# Patient Record
Sex: FEMALE | Race: WHITE | NOT HISPANIC OR LATINO | URBAN - METROPOLITAN AREA
[De-identification: names, ages, dates, MRNs, and addresses within clinical notes are randomized per-mention and may not be internally consistent; named-entity substitution may affect disease eponyms.]

---

## 2017-04-12 ENCOUNTER — OUTPATIENT (OUTPATIENT)
Dept: OUTPATIENT SERVICES | Facility: HOSPITAL | Age: 72
LOS: 1 days | End: 2017-04-12

## 2017-06-20 ENCOUNTER — OUTPATIENT (OUTPATIENT)
Dept: OUTPATIENT SERVICES | Facility: HOSPITAL | Age: 72
LOS: 1 days | End: 2017-06-20

## 2018-01-12 ENCOUNTER — OUTPATIENT (OUTPATIENT)
Dept: OUTPATIENT SERVICES | Facility: HOSPITAL | Age: 73
LOS: 1 days | End: 2018-01-12

## 2018-04-24 ENCOUNTER — OUTPATIENT (OUTPATIENT)
Dept: OUTPATIENT SERVICES | Facility: HOSPITAL | Age: 73
LOS: 1 days | End: 2018-04-24

## 2019-05-09 ENCOUNTER — OUTPATIENT (OUTPATIENT)
Dept: OUTPATIENT SERVICES | Facility: HOSPITAL | Age: 74
LOS: 1 days | End: 2019-05-09

## 2019-07-16 ENCOUNTER — APPOINTMENT (OUTPATIENT)
Dept: NEUROSURGERY | Facility: CLINIC | Age: 74
End: 2019-07-16
Payer: MEDICARE

## 2019-07-16 VITALS
BODY MASS INDEX: 31.22 KG/M2 | DIASTOLIC BLOOD PRESSURE: 61 MMHG | WEIGHT: 159 LBS | HEIGHT: 60 IN | SYSTOLIC BLOOD PRESSURE: 105 MMHG | HEART RATE: 81 BPM

## 2019-07-16 DIAGNOSIS — M43.10 SPONDYLOLISTHESIS, SITE UNSPECIFIED: ICD-10-CM

## 2019-07-16 DIAGNOSIS — G57.93 UNSPECIFIED MONONEUROPATHY OF BILATERAL LOWER LIMBS: ICD-10-CM

## 2019-07-16 DIAGNOSIS — Z98.890 OTHER SPECIFIED POSTPROCEDURAL STATES: ICD-10-CM

## 2019-07-16 PROBLEM — Z00.00 ENCOUNTER FOR PREVENTIVE HEALTH EXAMINATION: Status: ACTIVE | Noted: 2019-07-16

## 2019-07-16 PROCEDURE — 99204 OFFICE O/P NEW MOD 45 MIN: CPT

## 2019-07-16 RX ORDER — ROSUVASTATIN CALCIUM 40 MG/1
40 TABLET, FILM COATED ORAL
Refills: 0 | Status: ACTIVE | COMMUNITY

## 2019-07-16 RX ORDER — POTASSIUM CHLORIDE 1125 MG/1
15 TABLET, EXTENDED RELEASE ORAL
Refills: 0 | Status: ACTIVE | COMMUNITY

## 2019-07-16 RX ORDER — PANCRELIPASE 120000; 24000; 76000 [USP'U]/1; [USP'U]/1; [USP'U]/1
24000-76000 CAPSULE, DELAYED RELEASE PELLETS ORAL
Refills: 0 | Status: ACTIVE | COMMUNITY

## 2019-07-16 RX ORDER — PANTOPRAZOLE 40 MG/1
40 TABLET, DELAYED RELEASE ORAL
Refills: 0 | Status: ACTIVE | COMMUNITY

## 2019-07-16 RX ORDER — EZETIMIBE 10 MG/1
10 TABLET ORAL
Refills: 0 | Status: ACTIVE | COMMUNITY

## 2019-07-16 RX ORDER — HYDRALAZINE HYDROCHLORIDE 50 MG/1
50 TABLET ORAL
Refills: 0 | Status: ACTIVE | COMMUNITY

## 2019-07-16 RX ORDER — ESCITALOPRAM OXALATE 5 MG/1
5 TABLET ORAL
Refills: 0 | Status: ACTIVE | COMMUNITY

## 2019-07-16 RX ORDER — FOLIC ACID 1 MG/1
1 TABLET ORAL
Refills: 0 | Status: ACTIVE | COMMUNITY

## 2019-07-16 RX ORDER — ASPIRIN 81 MG
81 TABLET, DELAYED RELEASE (ENTERIC COATED) ORAL
Refills: 0 | Status: ACTIVE | COMMUNITY

## 2019-07-16 RX ORDER — LOSARTAN POTASSIUM 100 MG/1
100 TABLET, FILM COATED ORAL
Refills: 0 | Status: ACTIVE | COMMUNITY

## 2019-07-16 RX ORDER — FLUTICASONE PROPIONATE 50 UG/1
50 SPRAY, METERED NASAL
Refills: 0 | Status: ACTIVE | COMMUNITY

## 2019-07-16 RX ORDER — GABAPENTIN 300 MG/1
300 CAPSULE ORAL
Refills: 0 | Status: ACTIVE | COMMUNITY

## 2019-07-16 RX ORDER — FUROSEMIDE 40 MG/1
40 TABLET ORAL
Refills: 0 | Status: ACTIVE | COMMUNITY

## 2019-07-16 RX ORDER — COLESEVELAM HYDROCHLORIDE 625 MG/1
625 TABLET, FILM COATED ORAL
Refills: 0 | Status: ACTIVE | COMMUNITY

## 2019-07-16 NOTE — PLAN
[FreeTextEntry1] : This patient is status post L4-5 decompressive laminectomy and fusion. Unfortunately she has a neuropathy and it's unlikely that a change. The fusion procedure appears to be in them properly and screws were positioned. The lateral fusion as taken quite well. There is no overt instability at the adjacent levels although slightly some facet arthropathy noted. 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  \par \par I have not recommended any additional spinal surgery at this time. I recommended that she revisit her neurologist and pain management physician for optimization of the conditions aforementioned. She may be a candidate for facet injections are SI joint injection. She may be a candidate for spinal stimulator trial of her pain warrants it. In terms of her neuropathy a neurologist would be best to determine whether or not medication treatment is being optimized.\par \par

## 2019-07-16 NOTE — PHYSICAL EXAM
[Motor Tone] : muscle tone was normal in all four extremities [Motor Strength] : muscle strength was normal in all four extremities [Involuntary Movements] : no involuntary movements were seen [No Muscle Atrophy] : normal bulk in all four extremities [Abnormal Walk] : normal gait [Antalgic] : antalgic [Romberg's Sign] : Romberg's sign was negtive [FreeTextEntry7] : She has a stocking distribution in both lower gravities of numbness and tingling pins and needles.

## 2019-07-16 NOTE — HISTORY OF PRESENT ILLNESS
[de-identified] : \john Lockhart is a pleasant 73-year-old here for evaluation of her lumbar spine. I seen her in the distant past.  She had a grade 1 spondylolisthesis at L4-5 with severe stenosis. She opted for surgery elsewhere and had a relatively uneventful course. She returns with worsening difficulties walking and severe back pain. She's been under the care of her primary doctor, pain management doctor, her neurologist, and her rheumatologist.  She has symptoms reminiscent of facet joint or SI joint function on the right. She also has symptoms that are reminiscent of a rather significant neuropathy.\par \par \par PMD   Stevan Green\par neurology   Сергей Abreu\par PM  Vidal Baltazar\par rheum   [place]\par \par 2016   Lumbar fusion Sushila

## 2019-07-16 NOTE — RESULTS/DATA
[FreeTextEntry1] : [facility] MRI of the cervical and lumbar spine were reviewed. She has mild cervical spondylosis without any significant stenosis. Lumbar spine study shows the presence of the laminectomy L4-5 with instrumentation and fusion. X-rays done in the office confirmed that there truly is fusion across L4-5.

## 2019-07-16 NOTE — REASON FOR VISIT
[New Patient Visit] : a new patient visit [FreeTextEntry1] : LOWER BACK- NECK PAIN. [facility]- PREVIOUS SURGERY BY [facility].

## 2019-08-13 ENCOUNTER — APPOINTMENT (OUTPATIENT)
Dept: NEUROSURGERY | Facility: CLINIC | Age: 74
End: 2019-08-13

## 2019-10-18 ENCOUNTER — APPOINTMENT (OUTPATIENT)
Dept: RADIOLOGY | Facility: CLINIC | Age: 74
End: 2019-10-18
Payer: MEDICARE

## 2019-10-18 PROCEDURE — 74019 RADEX ABDOMEN 2 VIEWS: CPT

## 2019-10-25 ENCOUNTER — APPOINTMENT (OUTPATIENT)
Dept: RADIOLOGY | Facility: CLINIC | Age: 74
End: 2019-10-25
Payer: MEDICARE

## 2019-10-25 PROCEDURE — 73521 X-RAY EXAM HIPS BI 2 VIEWS: CPT

## 2019-10-25 PROCEDURE — 77080 DXA BONE DENSITY AXIAL: CPT

## 2019-10-28 ENCOUNTER — EMERGENCY (EMERGENCY)
Facility: HOSPITAL | Age: 74
LOS: 1 days | End: 2019-10-28
Admitting: EMERGENCY MEDICINE
Payer: MEDICARE

## 2019-10-28 PROCEDURE — 99284 EMERGENCY DEPT VISIT MOD MDM: CPT | Mod: 57

## 2019-10-28 PROCEDURE — 23620 CLTX GR HMRL TBRS FX WO MNPJ: CPT | Mod: 54

## 2019-10-28 PROCEDURE — 73060 X-RAY EXAM OF HUMERUS: CPT | Mod: 26,RT

## 2019-10-28 PROCEDURE — 73030 X-RAY EXAM OF SHOULDER: CPT | Mod: 26,RT

## 2023-06-17 ENCOUNTER — RX ONLY (RX ONLY)
Age: 78
End: 2023-06-17

## 2023-07-10 ENCOUNTER — RX ONLY (RX ONLY)
Age: 78
End: 2023-07-10

## 2023-07-10 ENCOUNTER — OFFICE (OUTPATIENT)
Facility: LOCATION | Age: 78
Setting detail: OPHTHALMOLOGY
End: 2023-07-10
Payer: MEDICARE

## 2023-07-10 DIAGNOSIS — H16.223: ICD-10-CM

## 2023-07-10 DIAGNOSIS — Z79.899: ICD-10-CM

## 2023-07-10 DIAGNOSIS — H43.813: ICD-10-CM

## 2023-07-10 DIAGNOSIS — M32.9: ICD-10-CM

## 2023-07-10 PROBLEM — H43.393 VITREOUS FLOATERS; BOTH EYES: Status: ACTIVE | Noted: 2023-07-10

## 2023-07-10 PROBLEM — Z96.1 PSEUDOPHAKIA ; BOTH EYES: Status: ACTIVE | Noted: 2023-07-10

## 2023-07-10 PROCEDURE — 99213 OFFICE O/P EST LOW 20 MIN: CPT | Performed by: OPHTHALMOLOGY

## 2023-07-10 PROCEDURE — 92134 CPTRZ OPH DX IMG PST SGM RTA: CPT | Performed by: OPHTHALMOLOGY

## 2023-07-10 ASSESSMENT — CONFRONTATIONAL VISUAL FIELD TEST (CVF)
OD_FINDINGS: FULL
OS_FINDINGS: FULL

## 2023-07-19 ASSESSMENT — SUPERFICIAL PUNCTATE KERATITIS (SPK)
OS_SPK: 1+
OD_SPK: 1+

## 2023-07-26 PROBLEM — M32.9 SYSTEMIC LUPUS ERYTHEMATOSUS, UNSPECIFIED: Status: ACTIVE | Noted: 2023-07-10

## 2023-07-26 ASSESSMENT — REFRACTION_CURRENTRX
OD_SPHERE: -0.25
OD_AXIS: 180
OD_ADD: +2.25
OS_CYLINDER: +0.75
OD_VPRISM_DIRECTION: PROGS
OS_ADD: +2.25
OD_OVR_VA: 20/
OS_OVR_VA: 20/
OS_AXIS: 153
OD_CYLINDER: +1.00
OS_SPHERE: PLANO

## 2023-07-26 ASSESSMENT — REFRACTION_MANIFEST
OS_SPHERE: PLANO
OD_SPHERE: -0.25
OD_CYLINDER: +1.00
OS_AXIS: 155
OD_AXIS: 180
OD_VA1: 20/20
OS_VA1: 20/30-2
OS_CYLINDER: +0.75

## 2023-07-26 ASSESSMENT — SPHEQUIV_DERIVED: OD_SPHEQUIV: 0.25

## 2023-07-26 ASSESSMENT — VISUAL ACUITY
OS_BCVA: 20/20
OD_BCVA: 20/40

## 2023-09-27 ENCOUNTER — RX ONLY (RX ONLY)
Age: 78
End: 2023-09-27

## 2023-09-27 ENCOUNTER — OFFICE (OUTPATIENT)
Facility: LOCATION | Age: 78
Setting detail: OPHTHALMOLOGY
End: 2023-09-27
Payer: MEDICARE

## 2023-09-27 DIAGNOSIS — H16.223: ICD-10-CM

## 2023-09-27 DIAGNOSIS — H10.433: ICD-10-CM

## 2023-09-27 PROCEDURE — 99213 OFFICE O/P EST LOW 20 MIN: CPT | Performed by: OPHTHALMOLOGY

## 2023-09-27 ASSESSMENT — SUPERFICIAL PUNCTATE KERATITIS (SPK)
OS_SPK: 1+
OD_SPK: 1+

## 2023-09-27 ASSESSMENT — CONFRONTATIONAL VISUAL FIELD TEST (CVF)
OS_FINDINGS: FULL
OD_FINDINGS: FULL

## 2023-10-02 ASSESSMENT — REFRACTION_MANIFEST
OD_SPHERE: -0.25
OS_SPHERE: PLANO
OD_CYLINDER: +1.00
OS_AXIS: 155
OD_AXIS: 180
OS_CYLINDER: +0.75
OD_VA1: 20/20
OS_VA1: 20/30-2

## 2023-10-02 ASSESSMENT — REFRACTION_CURRENTRX
OD_SPHERE: -0.25
OS_ADD: +2.25
OD_OVR_VA: 20/
OS_CYLINDER: +0.75
OS_OVR_VA: 20/
OD_AXIS: 180
OD_CYLINDER: +1.00
OD_VPRISM_DIRECTION: PROGS
OS_SPHERE: PLANO
OD_ADD: +2.25
OS_AXIS: 153

## 2023-10-02 ASSESSMENT — VISUAL ACUITY
OD_BCVA: 20/30+2
OS_BCVA: 20/30-2

## 2023-10-02 ASSESSMENT — SPHEQUIV_DERIVED: OD_SPHEQUIV: 0.25

## 2023-10-18 ENCOUNTER — RX ONLY (RX ONLY)
Age: 78
End: 2023-10-18

## 2023-10-18 ENCOUNTER — OFFICE (OUTPATIENT)
Facility: LOCATION | Age: 78
Setting detail: OPHTHALMOLOGY
End: 2023-10-18
Payer: MEDICARE

## 2023-10-18 DIAGNOSIS — H16.223: ICD-10-CM

## 2023-10-18 DIAGNOSIS — H10.433: ICD-10-CM

## 2023-10-18 PROCEDURE — 99213 OFFICE O/P EST LOW 20 MIN: CPT | Performed by: OPHTHALMOLOGY

## 2023-10-18 ASSESSMENT — SUPERFICIAL PUNCTATE KERATITIS (SPK)
OD_SPK: 1+
OS_SPK: 1+

## 2023-10-18 ASSESSMENT — CONFRONTATIONAL VISUAL FIELD TEST (CVF)
OS_FINDINGS: FULL
OD_FINDINGS: FULL

## 2023-10-26 ASSESSMENT — REFRACTION_CURRENTRX
OS_CYLINDER: +0.25
OD_CYLINDER: +1.00
OD_OVR_VA: 20/
OD_SPHERE: PLANO
OS_ADD: +2.25
OD_AXIS: 004
OS_OVR_VA: 20/
OS_AXIS: 035
OD_VPRISM_DIRECTION: PROGS
OS_SPHERE: +0.25
OS_VPRISM_DIRECTION: PROGS
OD_ADD: +2.25

## 2023-10-26 ASSESSMENT — REFRACTION_MANIFEST
OD_VA1: 20/20
OS_VA1: 20/30-2
OS_CYLINDER: +0.75
OS_AXIS: 155
OD_SPHERE: -0.25
OD_CYLINDER: +1.00
OD_AXIS: 180
OS_SPHERE: PLANO

## 2023-10-26 ASSESSMENT — SPHEQUIV_DERIVED: OD_SPHEQUIV: 0.25

## 2023-10-26 ASSESSMENT — VISUAL ACUITY
OS_BCVA: 20/30-2
OD_BCVA: 20/30-1

## 2023-11-07 ENCOUNTER — OFFICE (OUTPATIENT)
Facility: LOCATION | Age: 78
Setting detail: OPHTHALMOLOGY
End: 2023-11-07
Payer: MEDICARE

## 2023-11-07 ENCOUNTER — OFFICE (OUTPATIENT)
Facility: LOCATION | Age: 78
Setting detail: OPHTHALMOLOGY
End: 2023-11-07

## 2023-11-07 DIAGNOSIS — H16.223: ICD-10-CM

## 2023-11-07 DIAGNOSIS — H10.433: ICD-10-CM

## 2023-11-07 PROCEDURE — LEGACY BIL LEGACY BILLING: Performed by: OPHTHALMOLOGY

## 2023-11-07 PROCEDURE — 99213 OFFICE O/P EST LOW 20 MIN: CPT | Performed by: OPHTHALMOLOGY

## 2023-11-07 ASSESSMENT — REFRACTION_CURRENTRX
OS_ADD: +2.25
OD_CYLINDER: +1.00
OD_SPHERE: PLANO
OS_VPRISM_DIRECTION: PROGS
OD_AXIS: 004
OS_OVR_VA: 20/
OS_CYLINDER: +0.25
OD_ADD: +2.25
OD_VPRISM_DIRECTION: PROGS
OD_OVR_VA: 20/
OS_SPHERE: +0.25
OS_AXIS: 035

## 2023-11-07 ASSESSMENT — REFRACTION_MANIFEST
OS_CYLINDER: +0.75
OD_AXIS: 180
OD_VA1: 20/20
OS_SPHERE: PLANO
OD_CYLINDER: +1.00
OS_AXIS: 155
OS_VA1: 20/30-2
OD_SPHERE: -0.25

## 2023-11-07 ASSESSMENT — SUPERFICIAL PUNCTATE KERATITIS (SPK)
OD_SPK: 1+
OS_SPK: 1+

## 2023-11-07 ASSESSMENT — CONFRONTATIONAL VISUAL FIELD TEST (CVF)
OS_FINDINGS: FULL
OD_FINDINGS: FULL

## 2023-11-07 ASSESSMENT — SPHEQUIV_DERIVED: OD_SPHEQUIV: 0.25

## 2024-01-18 ENCOUNTER — OFFICE (OUTPATIENT)
Facility: LOCATION | Age: 79
Setting detail: OPHTHALMOLOGY
End: 2024-01-18
Payer: MEDICARE

## 2024-01-18 DIAGNOSIS — Z79.899: ICD-10-CM

## 2024-01-18 DIAGNOSIS — H10.433: ICD-10-CM

## 2024-01-18 DIAGNOSIS — M32.9: ICD-10-CM

## 2024-01-18 DIAGNOSIS — H16.223: ICD-10-CM

## 2024-01-18 PROBLEM — H35.033 HYPERTENSIVE RETINOPATHY; BOTH EYES: Status: ACTIVE | Noted: 2024-01-18

## 2024-01-18 PROCEDURE — 92083 EXTENDED VISUAL FIELD XM: CPT | Performed by: OPHTHALMOLOGY

## 2024-01-18 PROCEDURE — 92134 CPTRZ OPH DX IMG PST SGM RTA: CPT | Performed by: OPHTHALMOLOGY

## 2024-01-18 PROCEDURE — 92283 EXTND COLOR VISION XM: CPT | Performed by: OPHTHALMOLOGY

## 2024-01-18 PROCEDURE — 92014 COMPRE OPH EXAM EST PT 1/>: CPT | Performed by: OPHTHALMOLOGY

## 2024-01-18 ASSESSMENT — SUPERFICIAL PUNCTATE KERATITIS (SPK)
OD_SPK: 1+
OS_SPK: 1+

## 2024-01-18 ASSESSMENT — CONFRONTATIONAL VISUAL FIELD TEST (CVF)
OD_FINDINGS: FULL
OS_FINDINGS: FULL

## 2024-01-22 ASSESSMENT — REFRACTION_MANIFEST
OS_VA1: 20/30-2
OD_AXIS: 180
OD_CYLINDER: +1.00
OD_VA1: 20/20
OS_SPHERE: PLANO
OS_CYLINDER: +0.75
OD_SPHERE: -0.25
OS_AXIS: 155

## 2024-01-22 ASSESSMENT — REFRACTION_CURRENTRX
OD_VPRISM_DIRECTION: PROGS
OS_VPRISM_DIRECTION: PROGS
OS_SPHERE: PL
OS_OVR_VA: 20/
OS_CYLINDER: +0.50
OD_AXIS: 005
OD_OVR_VA: 20/
OD_CYLINDER: +1.00
OS_AXIS: 164
OS_ADD: +2.25
OD_SPHERE: -0.25
OD_ADD: +2.25

## 2024-01-22 ASSESSMENT — REFRACTION_AUTOREFRACTION
OS_CYLINDER: +1.00
OS_SPHERE: -1.00
OS_AXIS: 136
OD_AXIS: 022
OD_CYLINDER: +1.00
OD_SPHERE: -0.25

## 2024-01-22 ASSESSMENT — SPHEQUIV_DERIVED
OS_SPHEQUIV: -0.5
OD_SPHEQUIV: 0.25
OD_SPHEQUIV: 0.25

## 2024-07-22 ENCOUNTER — OFFICE (OUTPATIENT)
Facility: LOCATION | Age: 79
Setting detail: OPHTHALMOLOGY
End: 2024-07-22
Payer: MEDICARE

## 2024-07-22 DIAGNOSIS — H35.033: ICD-10-CM

## 2024-07-22 DIAGNOSIS — H04.563: ICD-10-CM

## 2024-07-22 DIAGNOSIS — M32.9: ICD-10-CM

## 2024-07-22 DIAGNOSIS — Z79.899: ICD-10-CM

## 2024-07-22 DIAGNOSIS — Z13.5: ICD-10-CM

## 2024-07-22 PROCEDURE — 92014 COMPRE OPH EXAM EST PT 1/>: CPT | Performed by: OPHTHALMOLOGY

## 2024-07-22 PROCEDURE — 92134 CPTRZ OPH DX IMG PST SGM RTA: CPT | Performed by: OPHTHALMOLOGY

## 2024-07-22 PROCEDURE — 92283 EXTND COLOR VISION XM: CPT | Performed by: OPHTHALMOLOGY

## 2024-07-22 PROCEDURE — 92083 EXTENDED VISUAL FIELD XM: CPT | Performed by: OPHTHALMOLOGY

## 2024-07-22 PROCEDURE — 92250 FUNDUS PHOTOGRAPHY W/I&R: CPT | Mod: GY | Performed by: OPHTHALMOLOGY

## 2024-07-22 ASSESSMENT — CONFRONTATIONAL VISUAL FIELD TEST (CVF)
OD_FINDINGS: FULL
OS_FINDINGS: FULL

## 2024-08-01 ENCOUNTER — RX ONLY (RX ONLY)
Age: 79
End: 2024-08-01

## 2024-08-01 ENCOUNTER — OFFICE (OUTPATIENT)
Facility: LOCATION | Age: 79
Setting detail: OPHTHALMOLOGY
End: 2024-08-01
Payer: MEDICARE

## 2024-08-01 DIAGNOSIS — H04.563: ICD-10-CM

## 2024-08-01 PROCEDURE — 68810 PROBE NASOLACRIMAL DUCT: CPT | Mod: 50 | Performed by: OPHTHALMOLOGY

## 2024-08-01 ASSESSMENT — CONFRONTATIONAL VISUAL FIELD TEST (CVF)
OD_FINDINGS: FULL
OS_FINDINGS: FULL

## 2024-10-08 ENCOUNTER — OFFICE (OUTPATIENT)
Facility: LOCATION | Age: 79
Setting detail: OPHTHALMOLOGY
End: 2024-10-08
Payer: MEDICARE

## 2024-10-08 DIAGNOSIS — H10.433: ICD-10-CM

## 2024-10-08 DIAGNOSIS — H16.223: ICD-10-CM

## 2024-10-08 DIAGNOSIS — H35.033: ICD-10-CM

## 2024-10-08 DIAGNOSIS — H04.563: ICD-10-CM

## 2024-10-08 PROCEDURE — 99213 OFFICE O/P EST LOW 20 MIN: CPT | Performed by: OPHTHALMOLOGY

## 2024-10-08 ASSESSMENT — LID EXAM ASSESSMENTS
OD_COMMENTS: OS
OS_COMMENTS: OS

## 2024-10-08 ASSESSMENT — CONFRONTATIONAL VISUAL FIELD TEST (CVF)
OS_FINDINGS: FULL
OD_FINDINGS: FULL

## 2024-10-08 ASSESSMENT — SUPERFICIAL PUNCTATE KERATITIS (SPK)
OD_SPK: 1+
OS_SPK: 1+

## 2024-10-09 ASSESSMENT — REFRACTION_AUTOREFRACTION
OD_AXIS: 177
OD_CYLINDER: +1.75
OS_SPHERE: -0.75
OD_SPHERE: -0.50
OS_CYLINDER: +1.00
OS_AXIS: 166

## 2024-10-09 ASSESSMENT — REFRACTION_CURRENTRX
OD_AXIS: 031
OS_CYLINDER: +1.00
OS_SPHERE: -0.25
OD_CYLINDER: +1.25
OS_AXIS: 139
OD_SPHERE: -0.25
OS_OVR_VA: 20/
OS_ADD: +2.25
OD_VPRISM_DIRECTION: PROGS
OD_ADD: +2.25
OD_OVR_VA: 20/
OS_VPRISM_DIRECTION: PROGS

## 2024-10-09 ASSESSMENT — REFRACTION_MANIFEST
OS_AXIS: 155
OS_VA1: 20/30-2
OD_CYLINDER: +1.00
OD_AXIS: 180
OS_SPHERE: PLANO
OD_VA1: 20/20
OS_CYLINDER: +0.75
OD_SPHERE: -0.25

## 2024-10-09 ASSESSMENT — KERATOMETRY
OD_AXISANGLE_DEGREES: 013
OD_K1POWER_DIOPTERS: 44.50
OD_K2POWER_DIOPTERS: 45.50
OS_K1POWER_DIOPTERS: 45.25
OS_K2POWER_DIOPTERS: 45.50
OS_AXISANGLE_DEGREES: 119

## 2024-10-09 ASSESSMENT — VISUAL ACUITY
OD_BCVA: 20/30
OS_BCVA: 20/30+3

## 2024-10-17 ENCOUNTER — RX ONLY (RX ONLY)
Age: 79
End: 2024-10-17

## 2024-10-17 ENCOUNTER — OFFICE (OUTPATIENT)
Facility: LOCATION | Age: 79
Setting detail: OPHTHALMOLOGY
End: 2024-10-17
Payer: MEDICARE

## 2024-10-17 DIAGNOSIS — H10.433: ICD-10-CM

## 2024-10-17 PROCEDURE — 99213 OFFICE O/P EST LOW 20 MIN: CPT | Performed by: OPHTHALMOLOGY

## 2024-10-17 ASSESSMENT — SUPERFICIAL PUNCTATE KERATITIS (SPK)
OD_SPK: 1+
OS_SPK: 1+

## 2024-10-17 ASSESSMENT — CONFRONTATIONAL VISUAL FIELD TEST (CVF)
OD_FINDINGS: FULL
OS_FINDINGS: FULL

## 2024-10-18 ASSESSMENT — REFRACTION_CURRENTRX
OS_SPHERE: -0.25
OD_SPHERE: -0.25
OD_VPRISM_DIRECTION: PROGS
OS_CYLINDER: +1.00
OD_ADD: +2.25
OD_OVR_VA: 20/
OS_VPRISM_DIRECTION: PROGS
OS_ADD: +2.25
OS_OVR_VA: 20/
OD_CYLINDER: +1.25
OD_AXIS: 031
OS_AXIS: 139

## 2024-10-18 ASSESSMENT — REFRACTION_AUTOREFRACTION
OD_AXIS: 177
OS_AXIS: 166
OD_CYLINDER: +1.75
OS_SPHERE: -0.75
OD_SPHERE: -0.50
OS_CYLINDER: +1.00

## 2024-10-18 ASSESSMENT — VISUAL ACUITY
OD_BCVA: 20/40-1
OS_BCVA: 20/40+1

## 2024-10-18 ASSESSMENT — REFRACTION_MANIFEST
OD_SPHERE: -0.25
OD_AXIS: 180
OS_SPHERE: PLANO
OS_AXIS: 155
OD_CYLINDER: +1.00
OS_CYLINDER: +0.75
OS_VA1: 20/30-2
OD_VA1: 20/20

## 2024-10-18 ASSESSMENT — KERATOMETRY
OD_K1POWER_DIOPTERS: 44.50
OS_K1POWER_DIOPTERS: 45.25
OD_AXISANGLE_DEGREES: 013
OS_K2POWER_DIOPTERS: 45.50
OS_AXISANGLE_DEGREES: 119
OD_K2POWER_DIOPTERS: 45.50

## 2024-11-01 ENCOUNTER — OFFICE (OUTPATIENT)
Facility: LOCATION | Age: 79
Setting detail: OPHTHALMOLOGY
End: 2024-11-01
Payer: MEDICARE

## 2024-11-01 ENCOUNTER — RX ONLY (RX ONLY)
Age: 79
End: 2024-11-01

## 2024-11-01 DIAGNOSIS — H16.041: ICD-10-CM

## 2024-11-01 PROCEDURE — 99213 OFFICE O/P EST LOW 20 MIN: CPT | Performed by: OPTOMETRIST

## 2024-11-01 ASSESSMENT — CONFRONTATIONAL VISUAL FIELD TEST (CVF)
OS_FINDINGS: FULL
OD_FINDINGS: FULL

## 2024-11-01 ASSESSMENT — SUPERFICIAL PUNCTATE KERATITIS (SPK)
OD_SPK: 1+
OS_SPK: 1+

## 2024-11-01 ASSESSMENT — CORNEAL EDEMA CLINICAL DESCRIPTION: OD_CORNEALEDEMA: 2+

## 2024-11-02 ENCOUNTER — RX ONLY (RX ONLY)
Age: 79
End: 2024-11-02

## 2024-11-02 ENCOUNTER — OFFICE (OUTPATIENT)
Facility: LOCATION | Age: 79
Setting detail: OPHTHALMOLOGY
End: 2024-11-02
Payer: MEDICARE

## 2024-11-02 DIAGNOSIS — H16.041: ICD-10-CM

## 2024-11-02 PROCEDURE — 99213 OFFICE O/P EST LOW 20 MIN: CPT | Performed by: OPHTHALMOLOGY

## 2024-11-02 ASSESSMENT — CORNEAL EDEMA CLINICAL DESCRIPTION
OS_CORNEALEDEMA: 2+
OD_CORNEALEDEMA: 2+

## 2024-11-02 ASSESSMENT — CONFRONTATIONAL VISUAL FIELD TEST (CVF)
OS_FINDINGS: FULL
OD_FINDINGS: FULL

## 2024-11-02 ASSESSMENT — SUPERFICIAL PUNCTATE KERATITIS (SPK)
OD_SPK: 1+
OS_SPK: 1+

## 2024-11-04 ENCOUNTER — OFFICE (OUTPATIENT)
Facility: LOCATION | Age: 79
Setting detail: OPHTHALMOLOGY
End: 2024-11-04
Payer: MEDICARE

## 2024-11-04 DIAGNOSIS — H16.041: ICD-10-CM

## 2024-11-04 PROCEDURE — 99213 OFFICE O/P EST LOW 20 MIN: CPT | Performed by: OPHTHALMOLOGY

## 2024-11-04 ASSESSMENT — REFRACTION_MANIFEST
OD_VA1: 20/20
OD_CYLINDER: +1.00
OS_AXIS: 155
OD_AXIS: 180
OS_SPHERE: PLANO
OD_SPHERE: -0.25
OS_CYLINDER: +0.75
OS_VA1: 20/30-2

## 2024-11-04 ASSESSMENT — REFRACTION_AUTOREFRACTION
OD_CYLINDER: +1.75
OD_SPHERE: -0.50
OS_SPHERE: -0.75
OS_CYLINDER: +1.00
OS_AXIS: 166
OD_AXIS: 177

## 2024-11-04 ASSESSMENT — VISUAL ACUITY
OD_BCVA: 20/30-2
OS_BCVA: 20/70-1

## 2024-11-04 ASSESSMENT — REFRACTION_CURRENTRX
OD_AXIS: 031
OD_SPHERE: -0.25
OD_ADD: +2.25
OS_VPRISM_DIRECTION: PROGS
OS_ADD: +2.25
OS_SPHERE: -0.25
OD_OVR_VA: 20/
OS_OVR_VA: 20/
OD_VPRISM_DIRECTION: PROGS
OS_AXIS: 139
OD_CYLINDER: +1.25
OS_CYLINDER: +1.00

## 2024-11-04 ASSESSMENT — KERATOMETRY
OD_K2POWER_DIOPTERS: 45.50
OD_AXISANGLE_DEGREES: 013
OD_K1POWER_DIOPTERS: 44.50
OS_K1POWER_DIOPTERS: 45.25
OS_AXISANGLE_DEGREES: 119
OS_K2POWER_DIOPTERS: 45.50

## 2024-11-04 ASSESSMENT — SUPERFICIAL PUNCTATE KERATITIS (SPK)
OS_SPK: 1+
OD_SPK: 1+

## 2024-11-04 ASSESSMENT — CORNEAL EDEMA CLINICAL DESCRIPTION
OS_CORNEALEDEMA: 2+
OD_CORNEALEDEMA: 2+

## 2024-11-06 ENCOUNTER — OFFICE (OUTPATIENT)
Facility: LOCATION | Age: 79
Setting detail: OPHTHALMOLOGY
End: 2024-11-06
Payer: MEDICARE

## 2024-11-06 DIAGNOSIS — H16.041: ICD-10-CM

## 2024-11-06 PROCEDURE — 99213 OFFICE O/P EST LOW 20 MIN: CPT | Performed by: OPHTHALMOLOGY

## 2024-11-06 ASSESSMENT — TONOMETRY
OS_IOP_MMHG: 12
OD_IOP_MMHG: 12

## 2024-11-07 ASSESSMENT — REFRACTION_AUTOREFRACTION
OD_CYLINDER: +1.75
OS_AXIS: 166
OS_CYLINDER: +1.00
OS_SPHERE: -0.75
OD_SPHERE: -0.50
OD_AXIS: 177

## 2024-11-07 ASSESSMENT — VISUAL ACUITY
OD_BCVA: 20/20
OS_BCVA: HM

## 2024-11-07 ASSESSMENT — REFRACTION_MANIFEST
OS_AXIS: 155
OS_VA1: 20/30-2
OD_AXIS: 180
OD_CYLINDER: +1.00
OS_SPHERE: PLANO
OD_VA1: 20/20
OD_SPHERE: -0.25
OS_CYLINDER: +0.75

## 2024-11-07 ASSESSMENT — REFRACTION_CURRENTRX
OS_OVR_VA: 20/
OD_AXIS: 031
OD_CYLINDER: +1.25
OD_VPRISM_DIRECTION: PROGS
OS_AXIS: 139
OS_CYLINDER: +1.00
OS_SPHERE: -0.25
OD_ADD: +2.25
OD_OVR_VA: 20/
OD_SPHERE: -0.25
OS_VPRISM_DIRECTION: PROGS
OS_ADD: +2.25

## 2024-11-07 ASSESSMENT — KERATOMETRY
OS_K2POWER_DIOPTERS: 45.50
OD_K1POWER_DIOPTERS: 44.50
OD_AXISANGLE_DEGREES: 013
OS_K1POWER_DIOPTERS: 45.25
OD_K2POWER_DIOPTERS: 45.50
OS_AXISANGLE_DEGREES: 119

## 2024-11-07 ASSESSMENT — SUPERFICIAL PUNCTATE KERATITIS (SPK)
OS_SPK: 1+
OD_SPK: 1+

## 2024-11-07 ASSESSMENT — CORNEAL EDEMA CLINICAL DESCRIPTION
OS_CORNEALEDEMA: 2+
OD_CORNEALEDEMA: 2+

## 2024-11-08 ENCOUNTER — OFFICE (OUTPATIENT)
Facility: LOCATION | Age: 79
Setting detail: OPHTHALMOLOGY
End: 2024-11-08
Payer: MEDICARE

## 2024-11-08 DIAGNOSIS — H16.041: ICD-10-CM

## 2024-11-08 PROCEDURE — 99213 OFFICE O/P EST LOW 20 MIN: CPT | Performed by: OPHTHALMOLOGY

## 2024-11-08 ASSESSMENT — REFRACTION_AUTOREFRACTION
OD_SPHERE: -0.50
OS_SPHERE: -0.75
OS_CYLINDER: +1.00
OS_AXIS: 166
OD_SPHERE: -0.50
OD_AXIS: 177
OD_AXIS: 177
OS_CYLINDER: +1.00
OD_CYLINDER: +1.75
OS_AXIS: 166
OD_CYLINDER: +1.75
OS_SPHERE: -0.75

## 2024-11-08 ASSESSMENT — REFRACTION_MANIFEST
OS_VA1: 20/30-2
OD_CYLINDER: +1.00
OD_CYLINDER: +1.00
OS_SPHERE: PLANO
OS_SPHERE: PLANO
OS_AXIS: 155
OD_VA1: 20/20
OS_CYLINDER: +0.75
OD_SPHERE: -0.25
OD_AXIS: 180
OS_CYLINDER: +0.75
OD_VA1: 20/20
OD_AXIS: 180
OD_SPHERE: -0.25
OS_VA1: 20/30-2
OS_AXIS: 155

## 2024-11-08 ASSESSMENT — KERATOMETRY
OS_K2POWER_DIOPTERS: 45.50
OD_K2POWER_DIOPTERS: 45.50
OD_K1POWER_DIOPTERS: 44.50
OS_AXISANGLE_DEGREES: 119
OD_AXISANGLE_DEGREES: 013
OS_K1POWER_DIOPTERS: 45.25
OS_K2POWER_DIOPTERS: 45.50
OD_K1POWER_DIOPTERS: 44.50
OS_AXISANGLE_DEGREES: 119
OD_AXISANGLE_DEGREES: 013
OD_K2POWER_DIOPTERS: 45.50
OS_K1POWER_DIOPTERS: 45.25

## 2024-11-08 ASSESSMENT — REFRACTION_CURRENTRX
OD_AXIS: 031
OS_SPHERE: -0.25
OD_ADD: +2.25
OD_CYLINDER: +1.25
OS_CYLINDER: +1.00
OS_VPRISM_DIRECTION: PROGS
OS_AXIS: 139
OS_SPHERE: -0.25
OS_OVR_VA: 20/
OD_VPRISM_DIRECTION: PROGS
OS_AXIS: 139
OS_CYLINDER: +1.00
OD_ADD: +2.25
OD_AXIS: 031
OS_ADD: +2.25
OS_VPRISM_DIRECTION: PROGS
OD_CYLINDER: +1.25
OD_OVR_VA: 20/
OS_ADD: +2.25
OD_OVR_VA: 20/
OD_VPRISM_DIRECTION: PROGS
OS_OVR_VA: 20/
OD_SPHERE: -0.25
OD_SPHERE: -0.25

## 2024-11-08 ASSESSMENT — CORNEAL EDEMA CLINICAL DESCRIPTION
OS_CORNEALEDEMA: 2+
OD_CORNEALEDEMA: 2+

## 2024-11-08 ASSESSMENT — VISUAL ACUITY
OS_BCVA: 20/CF 6
OD_BCVA: 20/20
OS_BCVA: 20/CF
OD_BCVA: 20/20

## 2024-11-08 ASSESSMENT — SUPERFICIAL PUNCTATE KERATITIS (SPK)
OD_SPK: 1+
OS_SPK: 1+

## 2024-11-12 ENCOUNTER — OFFICE (OUTPATIENT)
Facility: LOCATION | Age: 79
Setting detail: OPHTHALMOLOGY
End: 2024-11-12
Payer: MEDICARE

## 2024-11-12 DIAGNOSIS — H16.041: ICD-10-CM

## 2024-11-12 PROCEDURE — 99213 OFFICE O/P EST LOW 20 MIN: CPT | Performed by: OPHTHALMOLOGY

## 2024-11-12 ASSESSMENT — REFRACTION_MANIFEST
OS_CYLINDER: +0.75
OD_VA1: 20/20
OS_VA1: 20/30-2
OD_CYLINDER: +1.00
OS_SPHERE: PLANO
OD_SPHERE: -0.25
OD_AXIS: 180
OS_AXIS: 155

## 2024-11-12 ASSESSMENT — SUPERFICIAL PUNCTATE KERATITIS (SPK)
OS_SPK: 1+
OD_SPK: 1+

## 2024-11-12 ASSESSMENT — REFRACTION_CURRENTRX
OD_ADD: +2.25
OS_ADD: +2.25
OD_SPHERE: -0.25
OS_OVR_VA: 20/
OD_AXIS: 031
OD_VPRISM_DIRECTION: PROGS
OS_AXIS: 139
OD_CYLINDER: +1.25
OS_VPRISM_DIRECTION: PROGS
OS_CYLINDER: +1.00
OS_SPHERE: -0.25
OD_OVR_VA: 20/

## 2024-11-12 ASSESSMENT — REFRACTION_AUTOREFRACTION
OD_CYLINDER: +1.75
OD_SPHERE: -0.50
OS_AXIS: 166
OS_SPHERE: -0.75
OS_CYLINDER: +1.00
OD_AXIS: 177

## 2024-11-12 ASSESSMENT — CORNEAL EDEMA CLINICAL DESCRIPTION
OS_CORNEALEDEMA: 2+
OD_CORNEALEDEMA: 3+

## 2024-11-12 ASSESSMENT — KERATOMETRY
OS_AXISANGLE_DEGREES: 119
OD_K2POWER_DIOPTERS: 45.50
OD_K1POWER_DIOPTERS: 44.50
OS_K2POWER_DIOPTERS: 45.50
OS_K1POWER_DIOPTERS: 45.25
OD_AXISANGLE_DEGREES: 013

## 2024-11-12 ASSESSMENT — VISUAL ACUITY
OS_BCVA: 20/200
OD_BCVA: 20/20-

## 2024-11-14 ASSESSMENT — REFRACTION_MANIFEST
OS_CYLINDER: +0.75
OS_SPHERE: PLANO
OS_VA1: 20/30-2
OD_VA1: 20/20
OS_AXIS: 155
OD_SPHERE: -0.25
OD_CYLINDER: +1.00
OD_AXIS: 180

## 2024-11-14 ASSESSMENT — REFRACTION_CURRENTRX
OD_AXIS: 031
OS_SPHERE: -0.25
OS_OVR_VA: 20/
OD_OVR_VA: 20/
OS_ADD: +2.25
OS_AXIS: 139
OD_CYLINDER: +1.25
OS_CYLINDER: +1.00
OD_SPHERE: -0.25
OS_VPRISM_DIRECTION: PROGS
OD_ADD: +2.25
OD_VPRISM_DIRECTION: PROGS

## 2024-11-14 ASSESSMENT — KERATOMETRY
OS_AXISANGLE_DEGREES: 119
OS_K1POWER_DIOPTERS: 45.25
OS_K2POWER_DIOPTERS: 45.50
OD_AXISANGLE_DEGREES: 013
OD_K1POWER_DIOPTERS: 44.50
OD_K2POWER_DIOPTERS: 45.50

## 2024-11-14 ASSESSMENT — VISUAL ACUITY
OS_BCVA: CF 2FT
OD_BCVA: 20/30

## 2024-11-14 ASSESSMENT — REFRACTION_AUTOREFRACTION
OD_SPHERE: -0.50
OD_AXIS: 177
OS_AXIS: 166
OS_SPHERE: -0.75
OS_CYLINDER: +1.00
OD_CYLINDER: +1.75

## 2024-11-15 ENCOUNTER — OFFICE (OUTPATIENT)
Facility: LOCATION | Age: 79
Setting detail: OPHTHALMOLOGY
End: 2024-11-15
Payer: MEDICARE

## 2024-11-15 DIAGNOSIS — H16.041: ICD-10-CM

## 2024-11-15 PROCEDURE — 99213 OFFICE O/P EST LOW 20 MIN: CPT | Performed by: OPHTHALMOLOGY

## 2024-11-15 ASSESSMENT — CONFRONTATIONAL VISUAL FIELD TEST (CVF)
OD_FINDINGS: FULL
OS_FINDINGS: FULL

## 2024-11-15 ASSESSMENT — SUPERFICIAL PUNCTATE KERATITIS (SPK)
OS_SPK: 1+
OD_SPK: 1+

## 2024-11-15 ASSESSMENT — CORNEAL EDEMA CLINICAL DESCRIPTION
OD_CORNEALEDEMA: 3+
OS_CORNEALEDEMA: 2+

## 2024-11-16 ENCOUNTER — OFFICE (OUTPATIENT)
Facility: LOCATION | Age: 79
Setting detail: OPHTHALMOLOGY
End: 2024-11-16
Payer: MEDICARE

## 2024-11-16 ENCOUNTER — RX ONLY (RX ONLY)
Age: 79
End: 2024-11-16

## 2024-11-16 DIAGNOSIS — H16.041: ICD-10-CM

## 2024-11-16 PROCEDURE — 99213 OFFICE O/P EST LOW 20 MIN: CPT | Performed by: OPHTHALMOLOGY

## 2024-11-16 ASSESSMENT — CORNEAL EDEMA CLINICAL DESCRIPTION
OD_CORNEALEDEMA: 3+
OS_CORNEALEDEMA: 2+

## 2024-11-16 ASSESSMENT — CONFRONTATIONAL VISUAL FIELD TEST (CVF)
OS_FINDINGS: FULL
OD_FINDINGS: FULL

## 2024-11-16 ASSESSMENT — SUPERFICIAL PUNCTATE KERATITIS (SPK)
OD_SPK: 1+
OS_SPK: 1+

## 2024-11-19 ASSESSMENT — REFRACTION_AUTOREFRACTION
OD_SPHERE: -0.50
OS_AXIS: 166
OS_CYLINDER: +1.00
OD_AXIS: 177
OS_SPHERE: -0.75
OD_CYLINDER: +1.75

## 2024-11-19 ASSESSMENT — REFRACTION_MANIFEST
OS_SPHERE: PLANO
OS_VA1: 20/30-2
OD_AXIS: 180
OS_AXIS: 155
OS_CYLINDER: +0.75
OD_SPHERE: -0.25
OD_VA1: 20/20
OD_CYLINDER: +1.00

## 2024-11-19 ASSESSMENT — REFRACTION_CURRENTRX
OS_VPRISM_DIRECTION: PROGS
OD_AXIS: 031
OS_AXIS: 139
OD_OVR_VA: 20/
OS_SPHERE: -0.25
OD_VPRISM_DIRECTION: PROGS
OS_ADD: +2.25
OS_CYLINDER: +1.00
OD_SPHERE: -0.25
OD_ADD: +2.25
OD_CYLINDER: +1.25
OS_OVR_VA: 20/

## 2024-11-19 ASSESSMENT — KERATOMETRY
OD_K2POWER_DIOPTERS: 45.50
OS_K1POWER_DIOPTERS: 45.25
OS_AXISANGLE_DEGREES: 119
OS_K2POWER_DIOPTERS: 45.50
OD_AXISANGLE_DEGREES: 013
OD_K1POWER_DIOPTERS: 44.50

## 2024-11-19 ASSESSMENT — VISUAL ACUITY
OS_BCVA: HM
OD_BCVA: 20/30

## 2024-11-20 ENCOUNTER — OFFICE (OUTPATIENT)
Facility: LOCATION | Age: 79
Setting detail: OPHTHALMOLOGY
End: 2024-11-20
Payer: MEDICARE

## 2024-11-20 DIAGNOSIS — H16.041: ICD-10-CM

## 2024-11-20 PROCEDURE — 99213 OFFICE O/P EST LOW 20 MIN: CPT | Performed by: OPHTHALMOLOGY

## 2024-11-20 ASSESSMENT — KERATOMETRY
OS_K1POWER_DIOPTERS: 45.25
OD_K2POWER_DIOPTERS: 45.50
OD_AXISANGLE_DEGREES: 013
OD_K1POWER_DIOPTERS: 44.50
OS_K2POWER_DIOPTERS: 45.50
OS_AXISANGLE_DEGREES: 119

## 2024-11-20 ASSESSMENT — REFRACTION_AUTOREFRACTION
OS_AXIS: 166
OS_CYLINDER: +1.00
OD_CYLINDER: +1.75
OS_SPHERE: -0.75
OD_AXIS: 177
OD_SPHERE: -0.50

## 2024-11-20 ASSESSMENT — REFRACTION_CURRENTRX
OS_ADD: +2.25
OD_SPHERE: -0.25
OD_VPRISM_DIRECTION: PROGS
OS_CYLINDER: +1.00
OD_AXIS: 031
OS_AXIS: 139
OD_OVR_VA: 20/
OD_ADD: +2.25
OS_SPHERE: -0.25
OS_OVR_VA: 20/
OD_CYLINDER: +1.25
OS_VPRISM_DIRECTION: PROGS

## 2024-11-20 ASSESSMENT — REFRACTION_MANIFEST
OD_AXIS: 180
OS_AXIS: 155
OS_VA1: 20/30-2
OD_SPHERE: -0.25
OS_SPHERE: PLANO
OD_CYLINDER: +1.00
OS_CYLINDER: +0.75
OD_VA1: 20/20

## 2024-11-20 ASSESSMENT — SUPERFICIAL PUNCTATE KERATITIS (SPK)
OS_SPK: 1+
OD_SPK: 1+

## 2024-11-20 ASSESSMENT — CORNEAL EDEMA CLINICAL DESCRIPTION
OD_CORNEALEDEMA: 2+
OS_CORNEALEDEMA: 2+

## 2024-11-20 ASSESSMENT — VISUAL ACUITY
OD_BCVA: 20/20
OS_BCVA: HM

## 2024-11-21 ASSESSMENT — REFRACTION_AUTOREFRACTION
OS_SPHERE: -0.75
OS_AXIS: 166
OD_CYLINDER: +1.75
OD_AXIS: 177
OS_CYLINDER: +1.00
OD_SPHERE: -0.50

## 2024-11-21 ASSESSMENT — VISUAL ACUITY
OS_BCVA: 20/200
OD_BCVA: 20/20

## 2024-11-21 ASSESSMENT — REFRACTION_CURRENTRX
OD_AXIS: 031
OS_SPHERE: -0.25
OS_CYLINDER: +1.00
OD_VPRISM_DIRECTION: PROGS
OD_ADD: +2.25
OS_OVR_VA: 20/
OD_CYLINDER: +1.25
OD_SPHERE: -0.25
OD_OVR_VA: 20/
OS_ADD: +2.25
OS_VPRISM_DIRECTION: PROGS
OS_AXIS: 139

## 2024-11-21 ASSESSMENT — REFRACTION_MANIFEST
OD_CYLINDER: +1.00
OS_AXIS: 155
OS_VA1: 20/30-2
OD_AXIS: 180
OD_SPHERE: -0.25
OD_VA1: 20/20
OS_SPHERE: PLANO
OS_CYLINDER: +0.75

## 2024-11-21 ASSESSMENT — KERATOMETRY
OS_K1POWER_DIOPTERS: 45.25
OS_K2POWER_DIOPTERS: 45.50
OD_K2POWER_DIOPTERS: 45.50
OD_AXISANGLE_DEGREES: 013
OD_K1POWER_DIOPTERS: 44.50
OS_AXISANGLE_DEGREES: 119

## 2024-11-26 ENCOUNTER — RX ONLY (RX ONLY)
Age: 79
End: 2024-11-26

## 2024-11-26 ENCOUNTER — OFFICE (OUTPATIENT)
Facility: LOCATION | Age: 79
Setting detail: OPHTHALMOLOGY
End: 2024-11-26
Payer: MEDICARE

## 2024-11-26 DIAGNOSIS — H16.041: ICD-10-CM

## 2024-11-26 PROCEDURE — 99213 OFFICE O/P EST LOW 20 MIN: CPT | Performed by: OPTOMETRIST

## 2024-11-26 ASSESSMENT — TONOMETRY
OS_IOP_MMHG: 16
OD_IOP_MMHG: 16

## 2024-11-26 ASSESSMENT — SUPERFICIAL PUNCTATE KERATITIS (SPK)
OS_SPK: 1+
OD_SPK: 1+

## 2024-11-26 ASSESSMENT — CONFRONTATIONAL VISUAL FIELD TEST (CVF)
OS_FINDINGS: FULL
OD_FINDINGS: FULL

## 2024-11-26 ASSESSMENT — CORNEAL EDEMA CLINICAL DESCRIPTION
OS_CORNEALEDEMA: 2+
OD_CORNEALEDEMA: 2+

## 2024-11-27 ASSESSMENT — KERATOMETRY
OS_AXISANGLE_DEGREES: 119
OS_K1POWER_DIOPTERS: 45.25
OD_AXISANGLE_DEGREES: 013
OS_K2POWER_DIOPTERS: 45.50
OD_K1POWER_DIOPTERS: 44.50
OD_K2POWER_DIOPTERS: 45.50

## 2024-11-27 ASSESSMENT — REFRACTION_CURRENTRX
OD_ADD: +2.25
OD_OVR_VA: 20/
OD_SPHERE: -0.25
OS_VPRISM_DIRECTION: PROGS
OS_SPHERE: -0.25
OS_OVR_VA: 20/
OD_VPRISM_DIRECTION: PROGS
OD_CYLINDER: +1.25
OS_AXIS: 139
OS_ADD: +2.25
OD_AXIS: 031
OS_CYLINDER: +1.00

## 2024-11-27 ASSESSMENT — REFRACTION_AUTOREFRACTION
OS_CYLINDER: +1.00
OD_SPHERE: -0.50
OS_AXIS: 166
OD_AXIS: 177
OS_SPHERE: -0.75
OD_CYLINDER: +1.75

## 2024-11-27 ASSESSMENT — REFRACTION_MANIFEST
OD_CYLINDER: +1.00
OS_CYLINDER: +0.75
OD_SPHERE: -0.25
OS_VA1: 20/30-2
OD_VA1: 20/20
OS_SPHERE: PLANO
OS_AXIS: 155
OD_AXIS: 180

## 2024-11-27 ASSESSMENT — VISUAL ACUITY
OS_BCVA: 20/80-1
OD_BCVA: 20/25-1

## 2024-12-10 ENCOUNTER — OFFICE (OUTPATIENT)
Facility: LOCATION | Age: 79
Setting detail: OPHTHALMOLOGY
End: 2024-12-10
Payer: MEDICARE

## 2024-12-10 DIAGNOSIS — H16.041: ICD-10-CM

## 2024-12-10 PROCEDURE — 99213 OFFICE O/P EST LOW 20 MIN: CPT | Performed by: OPTOMETRIST

## 2024-12-10 ASSESSMENT — SUPERFICIAL PUNCTATE KERATITIS (SPK)
OS_SPK: 1+
OD_SPK: 1+

## 2024-12-10 ASSESSMENT — CORNEAL EDEMA CLINICAL DESCRIPTION
OD_CORNEALEDEMA: 2+
OS_CORNEALEDEMA: 2+

## 2024-12-10 ASSESSMENT — TONOMETRY: OD_IOP_MMHG: 18

## 2024-12-10 ASSESSMENT — CONFRONTATIONAL VISUAL FIELD TEST (CVF)
OS_FINDINGS: FULL
OD_FINDINGS: FULL

## 2024-12-11 ASSESSMENT — REFRACTION_CURRENTRX
OD_SPHERE: -0.25
OS_OVR_VA: 20/
OD_AXIS: 031
OS_CYLINDER: +1.00
OS_AXIS: 139
OD_CYLINDER: +1.25
OS_VPRISM_DIRECTION: PROGS
OD_ADD: +2.25
OS_ADD: +2.25
OS_SPHERE: -0.25
OD_OVR_VA: 20/
OD_VPRISM_DIRECTION: PROGS

## 2024-12-11 ASSESSMENT — REFRACTION_AUTOREFRACTION
OS_SPHERE: -0.75
OS_CYLINDER: +1.00
OS_AXIS: 166
OD_CYLINDER: +1.75
OD_SPHERE: -0.50
OD_AXIS: 177

## 2024-12-11 ASSESSMENT — KERATOMETRY
OS_K2POWER_DIOPTERS: 45.50
OD_K1POWER_DIOPTERS: 44.50
OS_K1POWER_DIOPTERS: 45.25
OD_K2POWER_DIOPTERS: 45.50
OS_AXISANGLE_DEGREES: 119
OD_AXISANGLE_DEGREES: 013

## 2024-12-11 ASSESSMENT — REFRACTION_MANIFEST
OS_AXIS: 155
OS_SPHERE: PLANO
OS_CYLINDER: +0.75
OD_AXIS: 180
OD_CYLINDER: +1.00
OD_SPHERE: -0.25
OS_VA1: 20/30-2
OD_VA1: 20/20

## 2024-12-11 ASSESSMENT — VISUAL ACUITY
OD_BCVA: 20/25-2
OS_BCVA: 20/50

## 2024-12-24 ENCOUNTER — OFFICE (OUTPATIENT)
Facility: LOCATION | Age: 79
Setting detail: OPHTHALMOLOGY
End: 2024-12-24
Payer: MEDICARE

## 2024-12-24 DIAGNOSIS — H16.041: ICD-10-CM

## 2024-12-24 PROCEDURE — 99213 OFFICE O/P EST LOW 20 MIN: CPT | Performed by: OPTOMETRIST

## 2024-12-24 ASSESSMENT — SUPERFICIAL PUNCTATE KERATITIS (SPK)
OS_SPK: 1+
OD_SPK: 1+

## 2024-12-24 ASSESSMENT — CORNEAL EDEMA CLINICAL DESCRIPTION
OD_CORNEALEDEMA: 2+
OS_CORNEALEDEMA: 2+

## 2024-12-24 ASSESSMENT — CONFRONTATIONAL VISUAL FIELD TEST (CVF)
OD_FINDINGS: FULL
OS_FINDINGS: FULL

## 2024-12-26 ASSESSMENT — REFRACTION_CURRENTRX
OD_ADD: +2.25
OS_CYLINDER: +1.00
OD_OVR_VA: 20/
OD_SPHERE: -0.25
OS_ADD: +2.25
OS_SPHERE: -0.25
OD_CYLINDER: +1.25
OD_AXIS: 031
OS_OVR_VA: 20/
OS_VPRISM_DIRECTION: PROGS
OS_AXIS: 139
OD_VPRISM_DIRECTION: PROGS

## 2024-12-26 ASSESSMENT — REFRACTION_AUTOREFRACTION
OD_CYLINDER: +1.75
OS_SPHERE: -0.75
OD_AXIS: 177
OD_SPHERE: -0.50
OS_CYLINDER: +1.00
OS_AXIS: 166

## 2024-12-26 ASSESSMENT — REFRACTION_MANIFEST
OS_SPHERE: PLANO
OS_VA1: 20/30-2
OS_AXIS: 155
OD_VA1: 20/20
OD_CYLINDER: +1.00
OD_SPHERE: -0.25
OD_AXIS: 180
OS_CYLINDER: +0.75

## 2024-12-26 ASSESSMENT — KERATOMETRY
OS_AXISANGLE_DEGREES: 119
OS_K2POWER_DIOPTERS: 45.50
OD_K2POWER_DIOPTERS: 45.50
OS_K1POWER_DIOPTERS: 45.25
OD_AXISANGLE_DEGREES: 013
OD_K1POWER_DIOPTERS: 44.50

## 2024-12-26 ASSESSMENT — VISUAL ACUITY
OD_BCVA: 20/20
OS_BCVA: 20/60-2

## 2025-01-10 ENCOUNTER — OFFICE (OUTPATIENT)
Facility: LOCATION | Age: 80
Setting detail: OPHTHALMOLOGY
End: 2025-01-10
Payer: MEDICARE

## 2025-01-10 ENCOUNTER — RX ONLY (RX ONLY)
Age: 80
End: 2025-01-10

## 2025-01-10 DIAGNOSIS — H16.041: ICD-10-CM

## 2025-01-10 PROCEDURE — 99213 OFFICE O/P EST LOW 20 MIN: CPT | Performed by: OPHTHALMOLOGY

## 2025-01-10 ASSESSMENT — SUPERFICIAL PUNCTATE KERATITIS (SPK)
OS_SPK: 1+
OD_SPK: 1+

## 2025-01-10 ASSESSMENT — CORNEAL EDEMA CLINICAL DESCRIPTION
OS_CORNEALEDEMA: 2+
OD_CORNEALEDEMA: 2+

## 2025-01-10 ASSESSMENT — TONOMETRY
OS_IOP_MMHG: 20
OD_IOP_MMHG: 20

## 2025-01-13 ASSESSMENT — REFRACTION_MANIFEST
OD_CYLINDER: +1.00
OD_VA1: 20/20
OS_VA1: 20/30-2
OD_AXIS: 180
OD_SPHERE: -0.25
OS_SPHERE: PLANO
OS_CYLINDER: +0.75
OS_AXIS: 155

## 2025-01-13 ASSESSMENT — REFRACTION_CURRENTRX
OD_SPHERE: -0.25
OD_AXIS: 031
OS_ADD: +2.25
OD_OVR_VA: 20/
OD_CYLINDER: +1.25
OS_OVR_VA: 20/
OS_AXIS: 139
OD_ADD: +2.25
OS_VPRISM_DIRECTION: PROGS
OS_CYLINDER: +1.00
OD_VPRISM_DIRECTION: PROGS
OS_SPHERE: -0.25

## 2025-01-13 ASSESSMENT — REFRACTION_AUTOREFRACTION
OD_SPHERE: -3.25
OD_AXIS: 013
OD_CYLINDER: +4.75
OS_SPHERE: -0.75
OS_CYLINDER: +1.00
OS_AXIS: 0.75

## 2025-01-13 ASSESSMENT — KERATOMETRY
OD_K2POWER_DIOPTERS: 45.50
OD_AXISANGLE_DEGREES: 013
OS_AXISANGLE_DEGREES: 119
OD_K1POWER_DIOPTERS: 44.50
OS_K1POWER_DIOPTERS: 45.25
OS_K2POWER_DIOPTERS: 45.50

## 2025-01-13 ASSESSMENT — VISUAL ACUITY
OS_BCVA: 20/50-2
OD_BCVA: 20/20

## 2025-02-07 ENCOUNTER — OFFICE (OUTPATIENT)
Facility: LOCATION | Age: 80
Setting detail: OPHTHALMOLOGY
End: 2025-02-07
Payer: MEDICARE

## 2025-02-07 DIAGNOSIS — H16.223: ICD-10-CM

## 2025-02-07 DIAGNOSIS — H02.832: ICD-10-CM

## 2025-02-07 DIAGNOSIS — H02.831: ICD-10-CM

## 2025-02-07 DIAGNOSIS — H02.835: ICD-10-CM

## 2025-02-07 DIAGNOSIS — H02.834: ICD-10-CM

## 2025-02-07 DIAGNOSIS — H16.041: ICD-10-CM

## 2025-02-07 PROCEDURE — 99213 OFFICE O/P EST LOW 20 MIN: CPT | Performed by: OPHTHALMOLOGY

## 2025-02-07 ASSESSMENT — SUPERFICIAL PUNCTATE KERATITIS (SPK)
OD_SPK: 1+
OS_SPK: 1+

## 2025-02-07 ASSESSMENT — CONFRONTATIONAL VISUAL FIELD TEST (CVF)
OD_FINDINGS: FULL
OS_FINDINGS: FULL

## 2025-02-07 ASSESSMENT — CORNEAL EDEMA CLINICAL DESCRIPTION
OD_CORNEALEDEMA: 2+
OS_CORNEALEDEMA: 2+

## 2025-02-07 ASSESSMENT — LID EXAM ASSESSMENTS
OS_DERMATOCHALASIS: 1+ 2+
OD_DERMATOCHALASIS: 1+ 2+

## 2025-02-10 ASSESSMENT — REFRACTION_AUTOREFRACTION
OS_CYLINDER: +1.00
OS_AXIS: 0.75
OD_CYLINDER: +4.75
OD_AXIS: 013
OD_SPHERE: -3.25
OS_SPHERE: -0.75

## 2025-02-10 ASSESSMENT — REFRACTION_CURRENTRX
OD_CYLINDER: +1.25
OD_OVR_VA: 20/
OD_AXIS: 031
OS_SPHERE: -0.25
OS_OVR_VA: 20/
OS_ADD: +2.25
OS_AXIS: 139
OS_CYLINDER: +1.00
OD_ADD: +2.25
OS_VPRISM_DIRECTION: PROGS
OD_SPHERE: -0.25

## 2025-02-10 ASSESSMENT — REFRACTION_MANIFEST
OD_SPHERE: -0.25
OD_AXIS: 180
OS_VA1: 20/30-2
OS_SPHERE: PLANO
OS_AXIS: 155
OS_CYLINDER: +0.75
OD_CYLINDER: +1.00
OD_VA1: 20/20

## 2025-02-10 ASSESSMENT — KERATOMETRY
OD_AXISANGLE_DEGREES: 013
OS_AXISANGLE_DEGREES: 119
OS_K1POWER_DIOPTERS: 45.25
OD_K2POWER_DIOPTERS: 45.50
OD_K1POWER_DIOPTERS: 44.50
OS_K2POWER_DIOPTERS: 45.50

## 2025-02-10 ASSESSMENT — VISUAL ACUITY
OS_BCVA: 20/50-2
OD_BCVA: 20/30+2

## 2025-02-28 ENCOUNTER — OFFICE (OUTPATIENT)
Facility: LOCATION | Age: 80
Setting detail: OPHTHALMOLOGY
End: 2025-02-28
Payer: MEDICARE

## 2025-02-28 DIAGNOSIS — H16.041: ICD-10-CM

## 2025-02-28 PROBLEM — H02.831 DERMATOCHALSIS; RIGHT UPPER LID, RIGHT LOWER LID, LEFT UPPER LID, LEFT LOWER LID: Status: ACTIVE | Noted: 2025-02-28

## 2025-02-28 PROBLEM — H02.832 DERMATOCHALSIS; RIGHT UPPER LID, RIGHT LOWER LID, LEFT UPPER LID, LEFT LOWER LID: Status: ACTIVE | Noted: 2025-02-28

## 2025-02-28 PROBLEM — H02.835 DERMATOCHALSIS; RIGHT UPPER LID, RIGHT LOWER LID, LEFT UPPER LID, LEFT LOWER LID: Status: ACTIVE | Noted: 2025-02-28

## 2025-02-28 PROBLEM — H02.834 DERMATOCHALSIS; RIGHT UPPER LID, RIGHT LOWER LID, LEFT UPPER LID, LEFT LOWER LID: Status: ACTIVE | Noted: 2025-02-28

## 2025-02-28 PROCEDURE — 99213 OFFICE O/P EST LOW 20 MIN: CPT | Performed by: OPHTHALMOLOGY

## 2025-02-28 ASSESSMENT — TONOMETRY
OS_IOP_MMHG: 19
OD_IOP_MMHG: 19

## 2025-02-28 ASSESSMENT — CONFRONTATIONAL VISUAL FIELD TEST (CVF)
OS_FINDINGS: FULL
OD_FINDINGS: FULL

## 2025-03-02 ASSESSMENT — REFRACTION_CURRENTRX
OS_CYLINDER: +0.75
OS_AXIS: 145
OS_ADD: +2.25
OD_SPHERE: -0.25
OS_OVR_VA: 20/
OD_ADD: +2.25
OD_OVR_VA: 20/
OD_CYLINDER: +1.50
OS_VPRISM_DIRECTION: PROGS
OS_SPHERE: PLANO
OD_AXIS: 017

## 2025-03-02 ASSESSMENT — KERATOMETRY
OD_K2POWER_DIOPTERS: 9.00
OS_K2POWER_DIOPTERS: 42.25
OS_K1POWER_DIOPTERS: 44.75
OD_K1POWER_DIOPTERS: 43.75
OD_AXISANGLE_DEGREES: 9
OS_AXISANGLE_DEGREES: 157

## 2025-03-02 ASSESSMENT — REFRACTION_MANIFEST
OD_SPHERE: -0.25
OD_VA1: 20/20
OS_SPHERE: PLANO
OS_VA1: 20/30-2
OD_CYLINDER: +1.00
OD_AXIS: 180
OS_CYLINDER: +0.75
OS_AXIS: 155

## 2025-03-02 ASSESSMENT — VISUAL ACUITY
OS_BCVA: 20/150
OD_BCVA: 20/20-1

## 2025-04-04 ENCOUNTER — RX ONLY (RX ONLY)
Age: 80
End: 2025-04-04

## 2025-04-04 ENCOUNTER — OFFICE (OUTPATIENT)
Facility: LOCATION | Age: 80
Setting detail: OPHTHALMOLOGY
End: 2025-04-04
Payer: MEDICARE

## 2025-04-04 DIAGNOSIS — H52.211: ICD-10-CM

## 2025-04-04 DIAGNOSIS — H52.4: ICD-10-CM

## 2025-04-04 DIAGNOSIS — H16.041: ICD-10-CM

## 2025-04-04 DIAGNOSIS — H16.223: ICD-10-CM

## 2025-04-04 DIAGNOSIS — H02.832: ICD-10-CM

## 2025-04-04 DIAGNOSIS — H02.831: ICD-10-CM

## 2025-04-04 PROCEDURE — 92015 DETERMINE REFRACTIVE STATE: CPT | Performed by: OPTOMETRIST

## 2025-04-04 PROCEDURE — 99213 OFFICE O/P EST LOW 20 MIN: CPT | Performed by: OPTOMETRIST

## 2025-04-04 PROCEDURE — 92025 CPTRIZED CORNEAL TOPOGRAPHY: CPT | Performed by: OPTOMETRIST

## 2025-04-04 ASSESSMENT — LID EXAM ASSESSMENTS
OS_DERMATOCHALASIS: 1+ 2+
OD_DERMATOCHALASIS: 1+ 2+

## 2025-04-04 ASSESSMENT — TONOMETRY
OD_IOP_MMHG: 18
OS_IOP_MMHG: 18

## 2025-04-04 ASSESSMENT — SUPERFICIAL PUNCTATE KERATITIS (SPK)
OS_SPK: 1+
OD_SPK: 1+

## 2025-04-04 ASSESSMENT — CORNEAL EDEMA CLINICAL DESCRIPTION
OD_CORNEALEDEMA: 2+
OS_CORNEALEDEMA: 2+

## 2025-04-04 ASSESSMENT — CONFRONTATIONAL VISUAL FIELD TEST (CVF)
OS_FINDINGS: FULL
OD_FINDINGS: FULL

## 2025-04-07 ASSESSMENT — REFRACTION_AUTOREFRACTION
OD_SPHERE: -4.00
OS_SPHERE: -0.75
OS_AXIS: 170
OD_CYLINDER: +6.25
OS_CYLINDER: +1.50
OD_AXIS: 9

## 2025-04-07 ASSESSMENT — REFRACTION_MANIFEST
OD_VA1: 20/20
OS_SPHERE: -0.25
OD_CYLINDER: +1.00
OD_AXIS: 15
OS_ADD: +2.50
OD_AXIS: 180
OS_VA1: 20/30-2
OD_SPHERE: -1.00
OD_ADD: +2.50
OS_SPHERE: PLANO
OD_CYLINDER: +3.25
OS_AXIS: 155
OS_CYLINDER: +0.75
OD_SPHERE: -0.25
OS_CYLINDER: SPH

## 2025-04-07 ASSESSMENT — REFRACTION_CURRENTRX
OD_AXIS: 124
OD_CYLINDER: +0.25
OS_OVR_VA: 20/
OS_ADD: +2.50
OD_OVR_VA: 20/
OD_SPHERE: PLANO
OS_VPRISM_DIRECTION: PROGS
OS_SPHERE: PLANO
OS_CYLINDER: +0.75
OD_ADD: +2.50
OD_VPRISM_DIRECTION: PROGS
OS_AXIS: 136

## 2025-04-07 ASSESSMENT — KERATOMETRY
OD_K1POWER_DIOPTERS: 43.75
OS_K1POWER_DIOPTERS: 44.75
OS_K2POWER_DIOPTERS: 42.25
OS_AXISANGLE_DEGREES: 157
OD_AXISANGLE_DEGREES: 9
OD_K2POWER_DIOPTERS: 9.00

## 2025-04-07 ASSESSMENT — VISUAL ACUITY
OS_BCVA: 20/100
OD_BCVA: 20/20-2

## 2025-05-16 ENCOUNTER — OFFICE (OUTPATIENT)
Facility: LOCATION | Age: 80
Setting detail: OPHTHALMOLOGY
End: 2025-05-16
Payer: MEDICARE

## 2025-05-16 DIAGNOSIS — H52.4: ICD-10-CM

## 2025-05-16 DIAGNOSIS — H16.223: ICD-10-CM

## 2025-05-16 DIAGNOSIS — H16.041: ICD-10-CM

## 2025-05-16 PROCEDURE — 92015 DETERMINE REFRACTIVE STATE: CPT | Mod: GA | Performed by: OPTOMETRIST

## 2025-05-16 PROCEDURE — 99213 OFFICE O/P EST LOW 20 MIN: CPT | Performed by: OPTOMETRIST

## 2025-05-16 ASSESSMENT — REFRACTION_CURRENTRX
OD_OVR_VA: 20/
OS_SPHERE: PLANO
OD_CYLINDER: +3.00
OS_VPRISM_DIRECTION: PROGS
OD_AXIS: 012
OS_AXIS: 034
OS_CYLINDER: +0.50
OS_OVR_VA: 20/
OD_VPRISM_DIRECTION: PROGS
OS_ADD: +2.50
OD_SPHERE: -0.50
OD_ADD: +2.50

## 2025-05-16 ASSESSMENT — TONOMETRY
OD_IOP_MMHG: 19
OS_IOP_MMHG: 19

## 2025-05-16 ASSESSMENT — KERATOMETRY
OS_AXISANGLE_DEGREES: 157
OS_K1POWER_DIOPTERS: 44.75
OS_K2POWER_DIOPTERS: 42.25
OD_AXISANGLE_DEGREES: 9
OD_K1POWER_DIOPTERS: 43.75
OD_K2POWER_DIOPTERS: 9.00

## 2025-05-16 ASSESSMENT — LID EXAM ASSESSMENTS
OS_DERMATOCHALASIS: 1+ 2+
OD_DERMATOCHALASIS: 1+ 2+

## 2025-05-16 ASSESSMENT — REFRACTION_MANIFEST
OD_SPHERE: -0.25
OD_CYLINDER: +3.25
OD_CYLINDER: +1.00
OS_CYLINDER: +1.00
OS_ADD: +2.50
OS_VA1: 20/30-2
OS_SPHERE: PLANO
OD_AXIS: 15
OS_CYLINDER: +0.75
OD_ADD: +2.50
OD_AXIS: 180
OS_VA1: 20/25
OD_SPHERE: -1.00
OS_SPHERE: -0.75
OS_AXIS: 155
OD_VA1: 20/20
OS_AXIS: 175

## 2025-05-16 ASSESSMENT — VISUAL ACUITY
OD_BCVA: 20/40-2
OS_BCVA: 20/60-2

## 2025-05-16 ASSESSMENT — REFRACTION_AUTOREFRACTION
OS_SPHERE: -0.50
OD_AXIS: 9
OS_AXIS: 173
OD_SPHERE: -4.00
OD_CYLINDER: +6.25
OS_CYLINDER: +1.00

## 2025-05-16 ASSESSMENT — CONFRONTATIONAL VISUAL FIELD TEST (CVF)
OS_FINDINGS: FULL
OD_FINDINGS: FULL

## 2025-05-16 ASSESSMENT — SUPERFICIAL PUNCTATE KERATITIS (SPK)
OD_SPK: 1+
OS_SPK: 1+

## 2025-05-16 ASSESSMENT — CORNEAL EDEMA CLINICAL DESCRIPTION
OS_CORNEALEDEMA: 2+
OD_CORNEALEDEMA: 2+

## 2025-07-02 ENCOUNTER — OFFICE (OUTPATIENT)
Facility: LOCATION | Age: 80
Setting detail: OPHTHALMOLOGY
End: 2025-07-02
Payer: MEDICARE

## 2025-07-02 DIAGNOSIS — H16.041: ICD-10-CM

## 2025-07-02 DIAGNOSIS — H16.223: ICD-10-CM

## 2025-07-02 DIAGNOSIS — H02.831: ICD-10-CM

## 2025-07-02 PROCEDURE — 99213 OFFICE O/P EST LOW 20 MIN: CPT | Performed by: OPTOMETRIST

## 2025-07-02 ASSESSMENT — CORNEAL EDEMA CLINICAL DESCRIPTION
OD_CORNEALEDEMA: 2+
OS_CORNEALEDEMA: 2+

## 2025-07-02 ASSESSMENT — REFRACTION_CURRENTRX
OS_SPHERE: PLANO
OD_ADD: +2.50
OD_OVR_VA: 20/
OS_OVR_VA: 20/
OD_SPHERE: -0.50
OD_AXIS: 012
OS_CYLINDER: +0.50
OS_VPRISM_DIRECTION: PROGS
OS_AXIS: 034
OD_CYLINDER: +3.00
OD_VPRISM_DIRECTION: PROGS
OS_ADD: +2.50

## 2025-07-02 ASSESSMENT — REFRACTION_MANIFEST
OS_AXIS: 155
OS_AXIS: 175
OD_AXIS: 180
OS_CYLINDER: +0.75
OS_VA1: 20/30-2
OD_VA1: 20/20
OD_CYLINDER: +3.25
OD_SPHERE: -1.00
OS_VA1: 20/25
OD_CYLINDER: +1.00
OD_SPHERE: -0.25
OS_SPHERE: PLANO
OD_ADD: +2.50
OS_ADD: +2.50
OD_AXIS: 15
OS_SPHERE: -0.75
OS_CYLINDER: +1.00

## 2025-07-02 ASSESSMENT — KERATOMETRY
OD_K1POWER_DIOPTERS: 43.75
OD_K2POWER_DIOPTERS: 9.00
OD_AXISANGLE_DEGREES: 9
OS_K1POWER_DIOPTERS: 44.75
OS_K2POWER_DIOPTERS: 42.25
OS_AXISANGLE_DEGREES: 157

## 2025-07-02 ASSESSMENT — SUPERFICIAL PUNCTATE KERATITIS (SPK)
OD_SPK: 1+
OS_SPK: 1+

## 2025-07-02 ASSESSMENT — VISUAL ACUITY
OS_BCVA: 20/40-1
OD_BCVA: 20/20-1

## 2025-07-02 ASSESSMENT — TONOMETRY
OS_IOP_MMHG: 21
OD_IOP_MMHG: 21

## 2025-07-02 ASSESSMENT — REFRACTION_AUTOREFRACTION
OS_AXIS: 173
OD_SPHERE: -4.00
OS_SPHERE: -0.50
OS_CYLINDER: +1.00
OD_AXIS: 9
OD_CYLINDER: +6.25

## 2025-07-02 ASSESSMENT — CONFRONTATIONAL VISUAL FIELD TEST (CVF)
OD_FINDINGS: FULL
OS_FINDINGS: FULL

## 2025-08-15 ENCOUNTER — OFFICE (OUTPATIENT)
Facility: LOCATION | Age: 80
Setting detail: OPHTHALMOLOGY
End: 2025-08-15
Payer: MEDICARE

## 2025-08-15 DIAGNOSIS — H16.223: ICD-10-CM

## 2025-08-15 DIAGNOSIS — H40.043: ICD-10-CM

## 2025-08-15 PROCEDURE — 99213 OFFICE O/P EST LOW 20 MIN: CPT | Performed by: OPTOMETRIST

## 2025-08-15 ASSESSMENT — REFRACTION_AUTOREFRACTION
OD_CYLINDER: +6.25
OD_SPHERE: -4.00
OS_AXIS: 173
OS_SPHERE: -0.50
OS_CYLINDER: +1.00
OD_AXIS: 9

## 2025-08-15 ASSESSMENT — REFRACTION_CURRENTRX
OS_CYLINDER: +0.50
OS_OVR_VA: 20/
OD_ADD: +2.50
OS_ADD: +2.50
OS_VPRISM_DIRECTION: PROGS
OD_VPRISM_DIRECTION: PROGS
OS_AXIS: 034
OD_CYLINDER: +3.00
OD_AXIS: 012
OD_SPHERE: -0.50
OD_OVR_VA: 20/
OS_SPHERE: PLANO

## 2025-08-15 ASSESSMENT — REFRACTION_MANIFEST
OD_SPHERE: -1.00
OS_VA1: 20/25
OS_CYLINDER: +0.75
OD_ADD: +2.50
OD_AXIS: 15
OS_SPHERE: -0.75
OD_SPHERE: -0.25
OD_CYLINDER: +3.25
OS_VA1: 20/30-2
OS_CYLINDER: +1.00
OS_SPHERE: PLANO
OS_ADD: +2.50
OD_AXIS: 180
OS_AXIS: 155
OD_CYLINDER: +1.00
OD_VA1: 20/20
OS_AXIS: 175

## 2025-08-15 ASSESSMENT — VISUAL ACUITY
OD_BCVA: 20/40+2
OS_BCVA: 20/60

## 2025-08-15 ASSESSMENT — CONFRONTATIONAL VISUAL FIELD TEST (CVF)
OS_FINDINGS: FULL
OD_FINDINGS: FULL

## 2025-08-15 ASSESSMENT — SUPERFICIAL PUNCTATE KERATITIS (SPK)
OD_SPK: 1+
OS_SPK: 1+

## 2025-08-15 ASSESSMENT — KERATOMETRY
OD_AXISANGLE_DEGREES: 9
OS_K1POWER_DIOPTERS: 44.75
OS_AXISANGLE_DEGREES: 157
OS_K2POWER_DIOPTERS: 42.25
OD_K2POWER_DIOPTERS: 9.00
OD_K1POWER_DIOPTERS: 43.75

## 2025-08-15 ASSESSMENT — LID EXAM ASSESSMENTS
OS_DERMATOCHALASIS: 1+ 2+
OD_DERMATOCHALASIS: 1+ 2+

## 2025-08-15 ASSESSMENT — CORNEAL EDEMA CLINICAL DESCRIPTION
OD_CORNEALEDEMA: 2+
OS_CORNEALEDEMA: 2+